# Patient Record
Sex: FEMALE | Race: WHITE | NOT HISPANIC OR LATINO | Employment: PART TIME | URBAN - METROPOLITAN AREA
[De-identification: names, ages, dates, MRNs, and addresses within clinical notes are randomized per-mention and may not be internally consistent; named-entity substitution may affect disease eponyms.]

---

## 2020-12-21 ENCOUNTER — TELEPHONE (OUTPATIENT)
Dept: SCHEDULING | Facility: IMAGING CENTER | Age: 20
End: 2020-12-21

## 2020-12-31 ENCOUNTER — TELEMEDICINE (OUTPATIENT)
Dept: MEDICAL GROUP | Facility: PHYSICIAN GROUP | Age: 20
End: 2020-12-31
Payer: COMMERCIAL

## 2020-12-31 VITALS — BODY MASS INDEX: 33.86 KG/M2 | WEIGHT: 250 LBS | HEIGHT: 72 IN

## 2020-12-31 DIAGNOSIS — Z30.09 ENCOUNTER FOR OTHER GENERAL COUNSELING OR ADVICE ON CONTRACEPTION: ICD-10-CM

## 2020-12-31 PROBLEM — Z30.9 CONTRACEPTIVE MANAGEMENT: Status: ACTIVE | Noted: 2020-12-31

## 2020-12-31 PROBLEM — Z97.5 NEXPLANON IN PLACE: Status: ACTIVE | Noted: 2020-12-31

## 2020-12-31 PROCEDURE — 99203 OFFICE O/P NEW LOW 30 MIN: CPT | Mod: 95,CR | Performed by: FAMILY MEDICINE

## 2020-12-31 SDOH — HEALTH STABILITY: MENTAL HEALTH: HOW OFTEN DO YOU HAVE A DRINK CONTAINING ALCOHOL?: 2-3 TIMES A WEEK

## 2020-12-31 SDOH — HEALTH STABILITY: MENTAL HEALTH: HOW OFTEN DO YOU HAVE 6 OR MORE DRINKS ON ONE OCCASION?: NEVER

## 2020-12-31 SDOH — HEALTH STABILITY: MENTAL HEALTH: HOW MANY STANDARD DRINKS CONTAINING ALCOHOL DO YOU HAVE ON A TYPICAL DAY?: 1 OR 2

## 2020-12-31 ASSESSMENT — PATIENT HEALTH QUESTIONNAIRE - PHQ9: CLINICAL INTERPRETATION OF PHQ2 SCORE: 0

## 2020-12-31 NOTE — ASSESSMENT & PLAN NOTE
"She had a nexplanon in February in 2019. She would like it removed. She would like it to be removed because she has been struggling with decreased libido, mood swings, irregular menses (as quick as 4-5 days in between), breast tenderness.     She has previously used OCPs (sprintek). It was \"ok\" but stopped as she couldn't take a pill every day. However, she thinks she could take a pill every day. She is thinking waiting a bit before starting the OCP after the nexplanon is removed to allow her cycle to go back to \"natural\".   "

## 2020-12-31 NOTE — PROGRESS NOTES
"Virtual Visit: New Patient   This visit was conducted via Zoom using secure and encrypted videoconferencing technology. The patient was in a private location in the state of [place].    The patient's identity was confirmed and verbal consent was obtained for this virtual visit.    Subjective:     CC:   Chief Complaint   Patient presents with   • Establish Care     discuss nexplanon removal and other BC options, was placed in 2019       Yolanda Titus is a 20 y.o. female presenting to establish care and to discuss the evaluation and management of:    Contraceptive management  She had a nexplanon in February in 2019. She would like it removed. She would like it to be removed because she has been struggling with decreased libido, mood swings, irregular menses (as quick as 4-5 days in between), breast tenderness.     She has previously used OCPs (sprintek). It was \"ok\" but stopped as she couldn't take a pill every day. However, she thinks she could take a pill every day. She is thinking waiting a bit before starting the OCP after the nexplanon is removed to allow her cycle to go back to \"natural\".     ROS  See HPI  Constitutional: Negative for fever, chills.   HENT: Negative for congestion.    Eyes: Negative for pain.   Respiratory: Negative for cough.    Cardiovascular: Negative for leg swelling.   Gastrointestinal: Negative for diarrhea.   Genitourinary: Negative for hematuria.   Skin: Negative for rash.   Neurological: Negative for focal weakness.   Endo/Heme/Allergies: Does not bleed easily.   Psychiatric/Behavioral: Negative for depression.  The patient is not nervous/anxious.      No Known Allergies    Current medicines (including changes today)  No current outpatient medications on file.     No current facility-administered medications for this visit.        She  has no past medical history on file.  She  has a past surgical history that includes tonsillectomy and adenoidectomy (2018).      Family History   Problem " "Relation Age of Onset   • Drug abuse Mother    • Alcohol abuse Mother    • No Known Problems Father    • Cancer Neg Hx    • Diabetes Neg Hx    • Heart Disease Neg Hx    • Stroke Neg Hx      No family status information on file.       Patient Active Problem List    Diagnosis Date Noted   • Contraceptive management 12/31/2020          Objective:   Ht 1.854 m (6' 1\")   Wt 113.4 kg (250 lb)   BMI 32.98 kg/m²  RR: 12    Physical Exam:  Constitutional: Alert, no distress, well-groomed.  Skin: No rashes in visible areas.  Eye: Round. Conjunctiva clear, lids normal. No icterus.   ENMT: Lips pink without lesions, good dentition, moist mucous membranes. Phonation normal.  Neck: No masses, no thyromegaly. Moves freely without pain.  Respiratory: Unlabored respiratory effort, no cough or audible wheeze  Psych: Alert and oriented x3, normal affect and mood.     Assessment and Plan:   The following treatment plan was discussed:     1. Encounter for other general counseling or advice on contraception  She is here today to discuss birth control.  She had Nexplanon placed in February, 2019 but has been developing some side effects.  She states she is irregular menses and they can be as close as 4 to 5 days apart.  She is also getting some mood swings and getting some symptoms of depression with decreased libido.  She also describes breast tenderness so she like to stop the Nexplanon.  She was on Sprintec in the past but only stopped because she had a heart remember to take the pill every day.  She states that she is more mature now would remember to take the pill every day so she like to know back on OCPs.  -She will schedule Nexplanon removal and we will prescribe OCPs at that time    Follow-up: Return for nexplanon removal.         "

## 2021-01-06 ENCOUNTER — OFFICE VISIT (OUTPATIENT)
Dept: MEDICAL GROUP | Facility: PHYSICIAN GROUP | Age: 21
End: 2021-01-06
Payer: COMMERCIAL

## 2021-01-06 VITALS
BODY MASS INDEX: 32.21 KG/M2 | HEART RATE: 73 BPM | DIASTOLIC BLOOD PRESSURE: 58 MMHG | OXYGEN SATURATION: 100 % | SYSTOLIC BLOOD PRESSURE: 110 MMHG | WEIGHT: 237.8 LBS | HEIGHT: 72 IN | TEMPERATURE: 97.5 F | RESPIRATION RATE: 20 BRPM

## 2021-01-06 DIAGNOSIS — Z30.011 ENCOUNTER FOR INITIAL PRESCRIPTION OF CONTRACEPTIVE PILLS: ICD-10-CM

## 2021-01-06 DIAGNOSIS — F32.A DEPRESSION, UNSPECIFIED DEPRESSION TYPE: ICD-10-CM

## 2021-01-06 DIAGNOSIS — Z30.46 ENCOUNTER FOR NEXPLANON REMOVAL: ICD-10-CM

## 2021-01-06 PROCEDURE — 11982 REMOVE DRUG IMPLANT DEVICE: CPT | Performed by: FAMILY MEDICINE

## 2021-01-06 RX ORDER — NORGESTIMATE AND ETHINYL ESTRADIOL 0.25-0.035
1 KIT ORAL DAILY
Qty: 84 TAB | Refills: 1 | Status: SHIPPED | OUTPATIENT
Start: 2021-01-06 | End: 2021-04-01 | Stop reason: SDUPTHER

## 2021-01-06 ASSESSMENT — PATIENT HEALTH QUESTIONNAIRE - PHQ9
CLINICAL INTERPRETATION OF PHQ2 SCORE: 2
SUM OF ALL RESPONSES TO PHQ QUESTIONS 1-9: 10
5. POOR APPETITE OR OVEREATING: 3 - NEARLY EVERY DAY

## 2021-01-06 NOTE — PROCEDURES
NEXPLANON REMOVAL PROCEDURE NOTE:    Indication: patient requests, side effects    The patient was counseled on the risks of Nexplanon removal including bleeding, infection, allergic reaction and need for repeat procedure. Patient was encouraged to consider alternative forms of birth control and to use non-hormonal back-up. Informed consent obtained.    Patient's left arm positioned. Nexplanon implant palpated. The area of the prior insertion site was prepped and draped in usual sterile fashion. 2% lidocaine with epinephrine injected around site. A 2mm incision made using a #15 scalpel. The implant was grasped with a hemostat and removed intact. Hemostasis achieved. The patient tolerated the procedure well without complication. She was instructed to call for fever, severe bleeding or uncontrolled pain.

## 2021-01-06 NOTE — PROGRESS NOTES
Subjective:     CC: nexplanon removal    HPI:   Yolanda presents to have her nexplanon removed.    Depression Screening    Little interest or pleasure in doing things?  1 - several days   Feeling down, depressed , or hopeless? 1 - several days   Trouble falling or staying asleep, or sleeping too much?  0 - not at all   Feeling tired or having little energy?  3 - nearly every day   Poor appetite or overeating?  3 - nearly every day   Feeling bad about yourself - or that you are a failure or have let yourself or your family down? 0 - not at all   Trouble concentrating on things, such as reading the newspaper or watching television? 2 - more than half the days   Moving or speaking so slowly that other people could have noticed.  Or the opposite - being so fidgety or restless that you have been moving around a lot more than usual?  0 - not at all   Thoughts that you would be better off dead, or of hurting yourself?  0 - not at all   Patient Health Questionnaire Score: 10       If depressive symptoms identified deferred to follow up visit unless specifically addressed in assesment and plan.    Interpretation of PHQ-9 Total Score   Score Severity   1-4 No Depression   5-9 Mild Depression   10-14 Moderate Depression   15-19 Moderately Severe Depression   20-27 Severe Depression    No past medical history on file.    Social History     Tobacco Use   • Smoking status: Never Smoker   • Smokeless tobacco: Never Used   Substance Use Topics   • Alcohol use: Yes     Frequency: 2-3 times a week     Drinks per session: 1 or 2     Binge frequency: Never   • Drug use: Yes     Frequency: 7.0 times per week     Types: Marijuana, Inhaled       Current Outpatient Medications Ordered in Epic   Medication Sig Dispense Refill   • norgestimate-ethinyl estradiol (SPRINTEC 28) 0.25-35 MG-MCG per tablet Take 1 Tab by mouth every day. 84 Tab 1     No current Epic-ordered facility-administered medications on file.        Allergies:  Patient has  "no known allergies.    Health Maintenance: deferred for next visit    ROS:  Gen: no fevers/chills  Pulm: no sob  CV: no chest pain    Objective:     Exam:  /58 (BP Location: Left arm, Patient Position: Sitting, BP Cuff Size: Adult)   Pulse 73   Temp 36.4 °C (97.5 °F) (Temporal)   Resp 20   Ht 1.854 m (6' 1\")   Wt 107.9 kg (237 lb 12.8 oz)   SpO2 100%   BMI 31.37 kg/m²  Body mass index is 31.37 kg/m².    Constitutional: Alert, no distress, well-groomed.  Skin: Warm, dry, good turgor, no rashes in visible areas.  Eye: Equal, round and reactive, conjunctiva clear, lids normal.  ENMT: Lips without lesions, good dentition, moist mucous membranes.  Neck: Trachea midline, no masses, no thyromegaly.  Respiratory: Unlabored respiratory effort, no cough.  MSK: Normal gait, moves all extremities.  Neuro: Grossly non-focal.   Psych: Alert and oriented x3, normal affect and mood.    Assessment & Plan:     20 y.o. female with the following -     1. Encounter for Nexplanon removal  She is here today to have the Nexplanon removed.  She would like it removed due to side effects and would like to restart birth control pill.  Please see procedure note for details.  - Consent for AMB Surgery/Procedure    2. Encounter for initial prescription of contraceptive pills  We had discussed at her last appointment prescribing OCPs once the nexplanon is removed. She had been on sprintec in the past without side effect and would like to start that again. Prescription has been sent.    3. Depression, unspecified depression type  Depression screen showing moderate depression. She denies SI/HI.  It is possibly related to her Nexplanon as she has noticed some mood swings as a side effect.     Return in about 4 weeks (around 2/3/2021) for f/u mood - VIRTUAL.    Please note that this dictation was created using voice recognition software. I have made every reasonable attempt to correct obvious errors, but I expect that there are errors of " grammar and possibly content that I did not discover before finalizing the note.

## 2021-02-09 ENCOUNTER — TELEMEDICINE (OUTPATIENT)
Dept: MEDICAL GROUP | Facility: PHYSICIAN GROUP | Age: 21
End: 2021-02-09
Payer: COMMERCIAL

## 2021-02-09 VITALS — WEIGHT: 237 LBS | BODY MASS INDEX: 32.1 KG/M2 | HEIGHT: 72 IN

## 2021-02-09 DIAGNOSIS — Z30.41 ENCOUNTER FOR SURVEILLANCE OF CONTRACEPTIVE PILLS: Primary | ICD-10-CM

## 2021-02-09 DIAGNOSIS — R45.86 MOOD SWING: ICD-10-CM

## 2021-02-09 PROCEDURE — 99212 OFFICE O/P EST SF 10 MIN: CPT | Performed by: FAMILY MEDICINE

## 2021-02-10 NOTE — ASSESSMENT & PLAN NOTE
She had her Nexplanon removed in January, 2021.  She had noticed increased mood swings and other mood difficulties while on the Nexplanon. She has noted more motivation, more energy, less valdez. She would say about 75% improved. She states the OCP is going well without any side effects.

## 2021-02-10 NOTE — PROGRESS NOTES
"Virtual Visit: Established Patient   This visit was conducted via Zoom using secure and encrypted videoconferencing technology. The patient was in a private location in the state of [place].    The patient's identity was confirmed and verbal consent was obtained for this virtual visit.    Subjective:   CC:   Chief Complaint   Patient presents with   • Follow-Up     mood improved       Yolanda Titus is a 20 y.o. female presenting for evaluation and management of:    Contraceptive management  She had her Nexplanon removed in January, 2021.  She had noticed increased mood swings and other mood difficulties while on the Nexplanon. She has noted more motivation, more energy, less valdez. She would say about 75% improved.    ROS   Denies any recent fevers or chills. No chest pains or shortness of breath.     No Known Allergies    Current medicines (including changes today)  Current Outpatient Medications   Medication Sig Dispense Refill   • norgestimate-ethinyl estradiol (SPRINTEC 28) 0.25-35 MG-MCG per tablet Take 1 Tab by mouth every day. 84 Tab 1     No current facility-administered medications for this visit.        Patient Active Problem List    Diagnosis Date Noted   • Contraceptive management 12/31/2020       Family History   Problem Relation Age of Onset   • Drug abuse Mother    • Alcohol abuse Mother    • No Known Problems Father    • Cancer Neg Hx    • Diabetes Neg Hx    • Heart Disease Neg Hx    • Stroke Neg Hx        She  has no past medical history on file.  She  has a past surgical history that includes tonsillectomy and adenoidectomy (2018).       Objective:   Ht 1.854 m (6' 1\") Comment: Patient reported  Wt 108 kg (237 lb) Comment: Patient reported  BMI 31.27 kg/m²  RR: 12    Physical Exam:  Constitutional: Alert, no distress, well-groomed.  Skin: No rashes in visible areas.  Eye: Round. Conjunctiva clear, lids normal. No icterus.   ENMT: Lips pink without lesions, good dentition, moist mucous membranes. " Phonation normal.  Neck: No masses, no thyromegaly. Moves freely without pain.  Respiratory: Unlabored respiratory effort, no cough or audible wheeze  Psych: Alert and oriented x3, normal affect and mood.       Assessment and Plan:   The following treatment plan was discussed:     1. Encounter for surveillance of contraceptive pills  2. Mood swing  This is an acute condition, improving.  In January, 2021 we removed her Nexplanon as she felt it was contributing to her mood swings.  Today she reports a 75% improvement in her mood, motivation, and energy since the Nexplanon has been removed.  She notes that the OCP is going very well without any side effects.  Sounds like her mood changes were in reaction to the Nexplanon so she likely does not require any medication or therapy at this time.      Follow-up: Return in about 6 months (around 8/9/2021) for Annual/wellness visit, Pap.

## 2021-02-26 ENCOUNTER — HOSPITAL ENCOUNTER (OUTPATIENT)
Facility: MEDICAL CENTER | Age: 21
End: 2021-02-26
Attending: PHYSICIAN ASSISTANT
Payer: COMMERCIAL

## 2021-02-26 PROCEDURE — U0005 INFEC AGEN DETEC AMPLI PROBE: HCPCS

## 2021-02-26 PROCEDURE — U0003 INFECTIOUS AGENT DETECTION BY NUCLEIC ACID (DNA OR RNA); SEVERE ACUTE RESPIRATORY SYNDROME CORONAVIRUS 2 (SARS-COV-2) (CORONAVIRUS DISEASE [COVID-19]), AMPLIFIED PROBE TECHNIQUE, MAKING USE OF HIGH THROUGHPUT TECHNOLOGIES AS DESCRIBED BY CMS-2020-01-R: HCPCS

## 2021-02-27 LAB — COVID ORDER STATUS COVID19: NORMAL

## 2021-02-28 LAB
SARS-COV-2 RNA RESP QL NAA+PROBE: NOTDETECTED
SPECIMEN SOURCE: NORMAL

## 2021-03-21 ENCOUNTER — HOSPITAL ENCOUNTER (OUTPATIENT)
Facility: MEDICAL CENTER | Age: 21
End: 2021-03-21
Attending: NURSE PRACTITIONER
Payer: COMMERCIAL

## 2021-03-21 LAB — COVID ORDER STATUS COVID19: NORMAL

## 2021-03-21 PROCEDURE — U0003 INFECTIOUS AGENT DETECTION BY NUCLEIC ACID (DNA OR RNA); SEVERE ACUTE RESPIRATORY SYNDROME CORONAVIRUS 2 (SARS-COV-2) (CORONAVIRUS DISEASE [COVID-19]), AMPLIFIED PROBE TECHNIQUE, MAKING USE OF HIGH THROUGHPUT TECHNOLOGIES AS DESCRIBED BY CMS-2020-01-R: HCPCS

## 2021-03-21 PROCEDURE — U0005 INFEC AGEN DETEC AMPLI PROBE: HCPCS

## 2021-03-22 LAB
SARS-COV-2 RNA RESP QL NAA+PROBE: NOTDETECTED
SPECIMEN SOURCE: NORMAL

## 2021-04-01 RX ORDER — NORGESTIMATE AND ETHINYL ESTRADIOL 0.25-0.035
1 KIT ORAL DAILY
Qty: 84 TABLET | Refills: 3 | Status: SHIPPED | OUTPATIENT
Start: 2021-04-01 | End: 2021-06-28 | Stop reason: SDUPTHER

## 2021-06-28 RX ORDER — NORGESTIMATE AND ETHINYL ESTRADIOL 0.25-0.035
1 KIT ORAL DAILY
Qty: 84 TABLET | Refills: 0 | OUTPATIENT
Start: 2021-06-28

## 2021-06-28 RX ORDER — NORGESTIMATE AND ETHINYL ESTRADIOL 0.25-0.035
1 KIT ORAL DAILY
Qty: 84 TABLET | Refills: 0 | Status: SHIPPED | OUTPATIENT
Start: 2021-06-28 | End: 2021-09-16

## 2021-12-16 RX ORDER — NORGESTIMATE AND ETHINYL ESTRADIOL 0.25-0.035
1 KIT ORAL DAILY
Qty: 84 TABLET | Refills: 0 | Status: SHIPPED | OUTPATIENT
Start: 2021-12-16 | End: 2022-03-08

## 2022-03-08 RX ORDER — NORGESTIMATE AND ETHINYL ESTRADIOL 0.25-0.035
1 KIT ORAL DAILY
Qty: 84 TABLET | Refills: 0 | Status: SHIPPED | OUTPATIENT
Start: 2022-03-08

## 2022-03-08 NOTE — TELEPHONE ENCOUNTER
PLEASE CALL PATIENT.    Needs appointment for further refills. Hasn't been seen in a year. She did not read my My Chart message her informing this in December.